# Patient Record
Sex: MALE | Race: WHITE | NOT HISPANIC OR LATINO | URBAN - METROPOLITAN AREA
[De-identification: names, ages, dates, MRNs, and addresses within clinical notes are randomized per-mention and may not be internally consistent; named-entity substitution may affect disease eponyms.]

---

## 2019-01-11 ENCOUNTER — OUTPATIENT (OUTPATIENT)
Dept: OUTPATIENT SERVICES | Facility: HOSPITAL | Age: 54
LOS: 1 days | Discharge: HOME | End: 2019-01-11

## 2019-01-11 VITALS
RESPIRATION RATE: 18 BRPM | SYSTOLIC BLOOD PRESSURE: 138 MMHG | TEMPERATURE: 98 F | HEIGHT: 69 IN | WEIGHT: 199.96 LBS | DIASTOLIC BLOOD PRESSURE: 80 MMHG | OXYGEN SATURATION: 100 % | HEART RATE: 68 BPM

## 2019-01-11 DIAGNOSIS — K42.9 UMBILICAL HERNIA WITHOUT OBSTRUCTION OR GANGRENE: ICD-10-CM

## 2019-01-11 DIAGNOSIS — Z01.818 ENCOUNTER FOR OTHER PREPROCEDURAL EXAMINATION: ICD-10-CM

## 2019-01-11 LAB
ALBUMIN SERPL ELPH-MCNC: 4.7 G/DL — SIGNIFICANT CHANGE UP (ref 3.5–5.2)
ALP SERPL-CCNC: 66 U/L — SIGNIFICANT CHANGE UP (ref 30–115)
ALT FLD-CCNC: 25 U/L — SIGNIFICANT CHANGE UP (ref 0–41)
ANION GAP SERPL CALC-SCNC: 16 MMOL/L — HIGH (ref 7–14)
APPEARANCE UR: CLEAR — SIGNIFICANT CHANGE UP
APTT BLD: 42.4 SEC — HIGH (ref 27–39.2)
AST SERPL-CCNC: 20 U/L — SIGNIFICANT CHANGE UP (ref 0–41)
BASOPHILS # BLD AUTO: 0.05 K/UL — SIGNIFICANT CHANGE UP (ref 0–0.2)
BASOPHILS NFR BLD AUTO: 0.6 % — SIGNIFICANT CHANGE UP (ref 0–1)
BILIRUB SERPL-MCNC: 0.6 MG/DL — SIGNIFICANT CHANGE UP (ref 0.2–1.2)
BILIRUB UR-MCNC: NEGATIVE — SIGNIFICANT CHANGE UP
BUN SERPL-MCNC: 16 MG/DL — SIGNIFICANT CHANGE UP (ref 10–20)
CALCIUM SERPL-MCNC: 9.8 MG/DL — SIGNIFICANT CHANGE UP (ref 8.5–10.1)
CHLORIDE SERPL-SCNC: 99 MMOL/L — SIGNIFICANT CHANGE UP (ref 98–110)
CO2 SERPL-SCNC: 26 MMOL/L — SIGNIFICANT CHANGE UP (ref 17–32)
COLOR SPEC: YELLOW — SIGNIFICANT CHANGE UP
CREAT SERPL-MCNC: 1 MG/DL — SIGNIFICANT CHANGE UP (ref 0.7–1.5)
DIFF PNL FLD: NEGATIVE — SIGNIFICANT CHANGE UP
EOSINOPHIL # BLD AUTO: 0.37 K/UL — SIGNIFICANT CHANGE UP (ref 0–0.7)
EOSINOPHIL NFR BLD AUTO: 4.5 % — SIGNIFICANT CHANGE UP (ref 0–8)
GLUCOSE SERPL-MCNC: 78 MG/DL — SIGNIFICANT CHANGE UP (ref 70–99)
GLUCOSE UR QL: NEGATIVE MG/DL — SIGNIFICANT CHANGE UP
HCT VFR BLD CALC: 45.7 % — SIGNIFICANT CHANGE UP (ref 42–52)
HGB BLD-MCNC: 15.6 G/DL — SIGNIFICANT CHANGE UP (ref 14–18)
IMM GRANULOCYTES NFR BLD AUTO: 0.4 % — HIGH (ref 0.1–0.3)
INR BLD: 1.04 RATIO — SIGNIFICANT CHANGE UP (ref 0.65–1.3)
KETONES UR-MCNC: NEGATIVE — SIGNIFICANT CHANGE UP
LEUKOCYTE ESTERASE UR-ACNC: NEGATIVE — SIGNIFICANT CHANGE UP
LYMPHOCYTES # BLD AUTO: 2.2 K/UL — SIGNIFICANT CHANGE UP (ref 1.2–3.4)
LYMPHOCYTES # BLD AUTO: 26.7 % — SIGNIFICANT CHANGE UP (ref 20.5–51.1)
MCHC RBC-ENTMCNC: 31.1 PG — HIGH (ref 27–31)
MCHC RBC-ENTMCNC: 34.1 G/DL — SIGNIFICANT CHANGE UP (ref 32–37)
MCV RBC AUTO: 91 FL — SIGNIFICANT CHANGE UP (ref 80–94)
MONOCYTES # BLD AUTO: 0.56 K/UL — SIGNIFICANT CHANGE UP (ref 0.1–0.6)
MONOCYTES NFR BLD AUTO: 6.8 % — SIGNIFICANT CHANGE UP (ref 1.7–9.3)
NEUTROPHILS # BLD AUTO: 5.02 K/UL — SIGNIFICANT CHANGE UP (ref 1.4–6.5)
NEUTROPHILS NFR BLD AUTO: 61 % — SIGNIFICANT CHANGE UP (ref 42.2–75.2)
NITRITE UR-MCNC: NEGATIVE — SIGNIFICANT CHANGE UP
NRBC # BLD: 0 /100 WBCS — SIGNIFICANT CHANGE UP (ref 0–0)
PH UR: 6.5 — SIGNIFICANT CHANGE UP (ref 5–8)
PLATELET # BLD AUTO: 285 K/UL — SIGNIFICANT CHANGE UP (ref 130–400)
POTASSIUM SERPL-MCNC: 4.1 MMOL/L — SIGNIFICANT CHANGE UP (ref 3.5–5)
POTASSIUM SERPL-SCNC: 4.1 MMOL/L — SIGNIFICANT CHANGE UP (ref 3.5–5)
PROT SERPL-MCNC: 7 G/DL — SIGNIFICANT CHANGE UP (ref 6–8)
PROT UR-MCNC: NEGATIVE MG/DL — SIGNIFICANT CHANGE UP
PROTHROM AB SERPL-ACNC: 11.9 SEC — SIGNIFICANT CHANGE UP (ref 9.95–12.87)
RBC # BLD: 5.02 M/UL — SIGNIFICANT CHANGE UP (ref 4.7–6.1)
RBC # FLD: 13.2 % — SIGNIFICANT CHANGE UP (ref 11.5–14.5)
SODIUM SERPL-SCNC: 141 MMOL/L — SIGNIFICANT CHANGE UP (ref 135–146)
SP GR SPEC: 1.01 — SIGNIFICANT CHANGE UP (ref 1.01–1.03)
UROBILINOGEN FLD QL: 0.2 MG/DL — SIGNIFICANT CHANGE UP (ref 0.2–0.2)
WBC # BLD: 8.23 K/UL — SIGNIFICANT CHANGE UP (ref 4.8–10.8)
WBC # FLD AUTO: 8.23 K/UL — SIGNIFICANT CHANGE UP (ref 4.8–10.8)

## 2019-01-11 NOTE — H&P PST ADULT - REASON FOR ADMISSION
52 yo m here for past, pt sched for umb hernia rep on 1/25/2019  denies any cp, palpitations,sob, fever, dysuria, uti.uri, no rec travels  exc tashia 2+ fos wo sob  no glasses  no hear def  no loose teeth/ no dent

## 2019-01-11 NOTE — H&P PST ADULT - NSANTHOSAYNRD_GEN_A_CORE
No. BERENICE screening performed.  STOP BANG Legend: 0-2 = LOW Risk; 3-4 = INTERMEDIATE Risk; 5-8 = HIGH Risk

## 2019-01-25 ENCOUNTER — OUTPATIENT (OUTPATIENT)
Dept: OUTPATIENT SERVICES | Facility: HOSPITAL | Age: 54
LOS: 1 days | Discharge: HOME | End: 2019-01-25

## 2019-01-25 VITALS
HEART RATE: 68 BPM | RESPIRATION RATE: 18 BRPM | TEMPERATURE: 98 F | HEIGHT: 69 IN | SYSTOLIC BLOOD PRESSURE: 116 MMHG | DIASTOLIC BLOOD PRESSURE: 78 MMHG | WEIGHT: 199.96 LBS | OXYGEN SATURATION: 98 %

## 2019-01-25 VITALS
OXYGEN SATURATION: 98 % | RESPIRATION RATE: 19 BRPM | HEART RATE: 66 BPM | SYSTOLIC BLOOD PRESSURE: 117 MMHG | DIASTOLIC BLOOD PRESSURE: 82 MMHG

## 2019-01-25 DIAGNOSIS — M89.9 DISORDER OF BONE, UNSPECIFIED: Chronic | ICD-10-CM

## 2019-01-25 RX ORDER — MORPHINE SULFATE 50 MG/1
2 CAPSULE, EXTENDED RELEASE ORAL
Qty: 0 | Refills: 0 | Status: DISCONTINUED | OUTPATIENT
Start: 2019-01-25 | End: 2019-01-25

## 2019-01-25 RX ORDER — SODIUM CHLORIDE 9 MG/ML
1000 INJECTION, SOLUTION INTRAVENOUS
Qty: 0 | Refills: 0 | Status: DISCONTINUED | OUTPATIENT
Start: 2019-01-25 | End: 2019-02-09

## 2019-01-25 RX ORDER — ONDANSETRON 8 MG/1
4 TABLET, FILM COATED ORAL ONCE
Qty: 0 | Refills: 0 | Status: DISCONTINUED | OUTPATIENT
Start: 2019-01-25 | End: 2019-02-09

## 2019-01-25 RX ORDER — ACETAMINOPHEN WITH CODEINE 300MG-30MG
1 TABLET ORAL
Qty: 20 | Refills: 0 | OUTPATIENT
Start: 2019-01-25

## 2019-01-25 RX ORDER — HYDROMORPHONE HYDROCHLORIDE 2 MG/ML
0.5 INJECTION INTRAMUSCULAR; INTRAVENOUS; SUBCUTANEOUS
Qty: 0 | Refills: 0 | Status: DISCONTINUED | OUTPATIENT
Start: 2019-01-25 | End: 2019-01-25

## 2019-01-25 RX ORDER — OXYCODONE AND ACETAMINOPHEN 5; 325 MG/1; MG/1
1 TABLET ORAL ONCE
Qty: 0 | Refills: 0 | Status: DISCONTINUED | OUTPATIENT
Start: 2019-01-25 | End: 2019-01-25

## 2019-01-25 RX ADMIN — SODIUM CHLORIDE 100 MILLILITER(S): 9 INJECTION, SOLUTION INTRAVENOUS at 08:12

## 2019-01-25 NOTE — CHART NOTE - NSCHARTNOTEFT_GEN_A_CORE
PACU ANESTHESIA ADMISSION NOTE      Procedure: Umbilical hernia repair    Post op diagnosis:  Umbilical hernia      ____  Intubated  TV:______       Rate: ______      FiO2: ______    __x__  Patent Airway    __x__  Full return of protective reflexes    __x__  Full recovery from anesthesia / back to baseline status      Vitals:   /84           HR  72         RR     12        O2 sat   98        Temp 97.6 F      Mental Status:  __x__ Awake   _____ Alert   _____ Drowsy   _____ Sedated    Nausea/Vomiting:  __x__ No    ____ Yes, See Post - Op Orders        Pain Scale (0-10):  __0___    Treatment: ____ None    ____ See Post - Op/PCA Orders    Post - Operative Fluids:   __x__ Oral   ____ See Post - Op Orders    Plan: Discharge:   _x___Home       _____Floor     _____Critical Care    _____  Other:_________________    Comments: No anesthesia complications noted.  Discharge once criteria met.

## 2019-01-30 DIAGNOSIS — K42.9 UMBILICAL HERNIA WITHOUT OBSTRUCTION OR GANGRENE: ICD-10-CM

## 2019-02-01 ENCOUNTER — TRANSCRIPTION ENCOUNTER (OUTPATIENT)
Age: 54
End: 2019-02-01

## 2020-01-17 ENCOUNTER — TRANSCRIPTION ENCOUNTER (OUTPATIENT)
Age: 55
End: 2020-01-17

## 2020-05-18 ENCOUNTER — EMERGENCY (EMERGENCY)
Facility: HOSPITAL | Age: 55
LOS: 0 days | Discharge: HOME | End: 2020-05-19
Attending: STUDENT IN AN ORGANIZED HEALTH CARE EDUCATION/TRAINING PROGRAM | Admitting: STUDENT IN AN ORGANIZED HEALTH CARE EDUCATION/TRAINING PROGRAM
Payer: COMMERCIAL

## 2020-05-18 VITALS
RESPIRATION RATE: 18 BRPM | SYSTOLIC BLOOD PRESSURE: 140 MMHG | HEART RATE: 92 BPM | TEMPERATURE: 98 F | OXYGEN SATURATION: 100 % | DIASTOLIC BLOOD PRESSURE: 68 MMHG

## 2020-05-18 DIAGNOSIS — R07.9 CHEST PAIN, UNSPECIFIED: ICD-10-CM

## 2020-05-18 DIAGNOSIS — M89.9 DISORDER OF BONE, UNSPECIFIED: Chronic | ICD-10-CM

## 2020-05-18 DIAGNOSIS — R10.13 EPIGASTRIC PAIN: ICD-10-CM

## 2020-05-18 DIAGNOSIS — R07.89 OTHER CHEST PAIN: ICD-10-CM

## 2020-05-18 LAB
ALBUMIN SERPL ELPH-MCNC: 4.5 G/DL — SIGNIFICANT CHANGE UP (ref 3.5–5.2)
ALP SERPL-CCNC: 61 U/L — SIGNIFICANT CHANGE UP (ref 30–115)
ALT FLD-CCNC: 29 U/L — SIGNIFICANT CHANGE UP (ref 0–41)
ANION GAP SERPL CALC-SCNC: 15 MMOL/L — HIGH (ref 7–14)
AST SERPL-CCNC: 25 U/L — SIGNIFICANT CHANGE UP (ref 0–41)
BASOPHILS # BLD AUTO: 0.05 K/UL — SIGNIFICANT CHANGE UP (ref 0–0.2)
BASOPHILS NFR BLD AUTO: 0.6 % — SIGNIFICANT CHANGE UP (ref 0–1)
BILIRUB SERPL-MCNC: 0.3 MG/DL — SIGNIFICANT CHANGE UP (ref 0.2–1.2)
BUN SERPL-MCNC: 17 MG/DL — SIGNIFICANT CHANGE UP (ref 10–20)
CALCIUM SERPL-MCNC: 9.2 MG/DL — SIGNIFICANT CHANGE UP (ref 8.5–10.1)
CHLORIDE SERPL-SCNC: 104 MMOL/L — SIGNIFICANT CHANGE UP (ref 98–110)
CO2 SERPL-SCNC: 24 MMOL/L — SIGNIFICANT CHANGE UP (ref 17–32)
CREAT SERPL-MCNC: 0.9 MG/DL — SIGNIFICANT CHANGE UP (ref 0.7–1.5)
EOSINOPHIL # BLD AUTO: 0.21 K/UL — SIGNIFICANT CHANGE UP (ref 0–0.7)
EOSINOPHIL NFR BLD AUTO: 2.5 % — SIGNIFICANT CHANGE UP (ref 0–8)
GLUCOSE SERPL-MCNC: 104 MG/DL — HIGH (ref 70–99)
HCT VFR BLD CALC: 45.4 % — SIGNIFICANT CHANGE UP (ref 42–52)
HGB BLD-MCNC: 15.6 G/DL — SIGNIFICANT CHANGE UP (ref 14–18)
IMM GRANULOCYTES NFR BLD AUTO: 0.2 % — SIGNIFICANT CHANGE UP (ref 0.1–0.3)
LIDOCAIN IGE QN: 45 U/L — SIGNIFICANT CHANGE UP (ref 7–60)
LYMPHOCYTES # BLD AUTO: 1.96 K/UL — SIGNIFICANT CHANGE UP (ref 1.2–3.4)
LYMPHOCYTES # BLD AUTO: 23.6 % — SIGNIFICANT CHANGE UP (ref 20.5–51.1)
MCHC RBC-ENTMCNC: 32 PG — HIGH (ref 27–31)
MCHC RBC-ENTMCNC: 34.4 G/DL — SIGNIFICANT CHANGE UP (ref 32–37)
MCV RBC AUTO: 93 FL — SIGNIFICANT CHANGE UP (ref 80–94)
MONOCYTES # BLD AUTO: 0.51 K/UL — SIGNIFICANT CHANGE UP (ref 0.1–0.6)
MONOCYTES NFR BLD AUTO: 6.1 % — SIGNIFICANT CHANGE UP (ref 1.7–9.3)
NEUTROPHILS # BLD AUTO: 5.55 K/UL — SIGNIFICANT CHANGE UP (ref 1.4–6.5)
NEUTROPHILS NFR BLD AUTO: 67 % — SIGNIFICANT CHANGE UP (ref 42.2–75.2)
NRBC # BLD: 0 /100 WBCS — SIGNIFICANT CHANGE UP (ref 0–0)
PLATELET # BLD AUTO: 274 K/UL — SIGNIFICANT CHANGE UP (ref 130–400)
POTASSIUM SERPL-MCNC: 4.3 MMOL/L — SIGNIFICANT CHANGE UP (ref 3.5–5)
POTASSIUM SERPL-SCNC: 4.3 MMOL/L — SIGNIFICANT CHANGE UP (ref 3.5–5)
PROT SERPL-MCNC: 6.6 G/DL — SIGNIFICANT CHANGE UP (ref 6–8)
RBC # BLD: 4.88 M/UL — SIGNIFICANT CHANGE UP (ref 4.7–6.1)
RBC # FLD: 13.2 % — SIGNIFICANT CHANGE UP (ref 11.5–14.5)
SODIUM SERPL-SCNC: 143 MMOL/L — SIGNIFICANT CHANGE UP (ref 135–146)
TROPONIN T SERPL-MCNC: <0.01 NG/ML — SIGNIFICANT CHANGE UP
WBC # BLD: 8.3 K/UL — SIGNIFICANT CHANGE UP (ref 4.8–10.8)
WBC # FLD AUTO: 8.3 K/UL — SIGNIFICANT CHANGE UP (ref 4.8–10.8)

## 2020-05-18 PROCEDURE — 93010 ELECTROCARDIOGRAM REPORT: CPT

## 2020-05-18 PROCEDURE — 99220: CPT

## 2020-05-18 PROCEDURE — 71046 X-RAY EXAM CHEST 2 VIEWS: CPT | Mod: 26

## 2020-05-18 RX ORDER — FAMOTIDINE 10 MG/ML
20 INJECTION INTRAVENOUS DAILY
Refills: 0 | Status: DISCONTINUED | OUTPATIENT
Start: 2020-05-18 | End: 2020-05-19

## 2020-05-18 RX ORDER — FAMOTIDINE 10 MG/ML
20 INJECTION INTRAVENOUS ONCE
Refills: 0 | Status: COMPLETED | OUTPATIENT
Start: 2020-05-18 | End: 2020-05-18

## 2020-05-18 RX ORDER — ACETAMINOPHEN 500 MG
650 TABLET ORAL ONCE
Refills: 0 | Status: COMPLETED | OUTPATIENT
Start: 2020-05-18 | End: 2020-05-18

## 2020-05-18 RX ADMIN — Medication 650 MILLIGRAM(S): at 21:47

## 2020-05-18 RX ADMIN — FAMOTIDINE 104 MILLIGRAM(S): 10 INJECTION INTRAVENOUS at 16:05

## 2020-05-18 RX ADMIN — FAMOTIDINE 20 MILLIGRAM(S): 10 INJECTION INTRAVENOUS at 21:47

## 2020-05-18 NOTE — ED CDU PROVIDER INITIAL DAY NOTE - ATTENDING CONTRIBUTION TO CARE
55 yo m no pmh, former smoker here for cp. pt states CP began as epigastric pain radiating up. pain had improved a bit over the day but he is still experiencing midsternal chest tightness.  no sob, lightheadedness, n,v,d,fever, chills, cough.    vss  gen- NAD, aaox3  card-rrr  lungs-ctab, no wheezing or rhonchi  abd-sntnd, no guarding or rebound  neuro- full str/sensation, cn ii-xii grossly intact, normal coordination and gait    in ed- ekg w/o e/o acute ischaemia, trop neg.  pt placed in obs for acs r/o

## 2020-05-18 NOTE — ED PROVIDER NOTE - NS ED ROS FT
Review of Systems:  	•	CONSTITUTIONAL: no fever, no diaphoresis, no chills  	•	SKIN: no rash  	•	HEMATOLOGIC: no bleeding, no bruising  	•	ENT: no sore throat, no difficulty swallowing   	•	RESPIRATORY: no shortness of breath, no cough  	•	CARDIAC: +chest pain, no palpitations  	•	GI: +epigastric discomfort, no nausea, no vomiting, no diarrhea   	•	MUSCULOSKELETAL: no joint paint, no swelling, no redness  	•	NEUROLOGIC: no headache, no UE weakness, no numbness, no paresthesias   	•	PSYCH: no anxiety, no depression

## 2020-05-18 NOTE — ED CDU PROVIDER INITIAL DAY NOTE - PROGRESS NOTE DETAILS
pt seen bedside, nad, still having some chest discomfort. will repeat ekg. pt scheduled for CCTA in am. will continue to monitor.

## 2020-05-18 NOTE — ED PROVIDER NOTE - PROGRESS NOTE DETAILS
Bedside aorta/cardiac ultrasound performed. no evidence of acute pathology. pending labs. Patient reports moderate improvement of epigastric discomfort after pepcid. patient agrees to obs.

## 2020-05-18 NOTE — ED PROVIDER NOTE - OBJECTIVE STATEMENT
54 year old male, past medical history GERD, who presents with gradual onset of moderate, central, non-radiating chest tightness. Patient reports two episodes of pain, self resolved PTA. Patient also reports mild epigastric discomfort x3 days. No hx similar, no hx appendectomy. No fever, chills, cough, UE weakness/numbness/paresthesias, diaphoresis, nausea, vomiting, diarrhea, urinary symptoms. Last colonoscopy x1 year ago, unremarkable.

## 2020-05-18 NOTE — ED PROVIDER NOTE - ATTENDING CONTRIBUTION TO CARE
I personally evaluated the patient. I reviewed the Resident’s or Physician Assistant’s note (as assigned above), and agree with the findings and plan except as documented in my note.   55 Y/O M GERD, FORMER SMOKER C/O 2-3 DAYS FO LUQ PAIN AND TODAY CP. CP IS SUBSTERNAL AND RADIATES TO THE BACK. PAIN IS INTERMITTENT AND NOT EXERTIONAL OR PLEURITIC. NO ASSOCIATED SOB, NAUSEA, DIAPHORESIS, DIZZINESS, PALPITATIONS. NO LEG EDEMA OR PAIN. NO N/V/D. NO FEVER, COUGH. NO PRIOR CARDIAC WORKUP. VITALS NOTED. ALERT OX3 NAD WELL APPEARING. NCAT PERRL. EOMI. OP NORMAL. MMM. NECK SUPPLE. LUNGS CLEAR B/L. RRR. S1S2. ABD- SOFT NONTENDER. NO LEG EDEMA. CN 2-12 INTACT. NEURO EXAM NONFOCAL.

## 2020-05-18 NOTE — ED PROVIDER NOTE - CLINICAL SUMMARY MEDICAL DECISION MAKING FREE TEXT BOX
53 Y/O M + SMOKER WITH CP. EKG WITH NO ISCHEMIC CHANGES. TROPONIN NORMAL. PT TO [facility] FOR SERIAL EKGS/MONIQUE AND PROVOCATIVE TESTING.

## 2020-05-18 NOTE — ED PROVIDER NOTE - PHYSICAL EXAMINATION
CONST: Well appearing in NAD  EYES: Sclera and conjunctiva clear.   ENT: Oropharynx normal appearing, no erythema or exudates. No abscess or swelling. Uvula midline.   NECK: Non-tender, normal ROM   CARD: Normal S1 S2; Normal rate and rhythm  RESP: Equal BS B/L, No wheezes, rhonchi or rales. No distress  GI: Soft, non-tender, non-distended. no rebound or guarding  MS: No chest wall TTP, no overlying skin changes. Normal ROM in all extremities. No midline spinal tenderness. no calf tenderness or swelling   SKIN: Warm, dry, no acute rashes.   NEURO: A&Ox3, No focal deficits. Strength 5/5 UE/LE bilaterally with no sensory deficits.

## 2020-05-18 NOTE — ED CDU PROVIDER INITIAL DAY NOTE - OBJECTIVE STATEMENT
55 y/o male with no significant PMHx, Former Smoker presenting for chest pain. As per pt, chest pain began this morning and gradually worsened throughout the day. Pain is midsternal, constant, tightness sensation. Pt does admit to pain in the back in between his scapulas. Denies any other associated symptoms SOB/, syncope, dizziness, N/V/D, fever/chills, diaphoresis, numbness/tingling

## 2020-05-19 VITALS
RESPIRATION RATE: 18 BRPM | HEART RATE: 56 BPM | DIASTOLIC BLOOD PRESSURE: 86 MMHG | SYSTOLIC BLOOD PRESSURE: 131 MMHG | OXYGEN SATURATION: 99 % | TEMPERATURE: 97 F

## 2020-05-19 LAB — SARS-COV-2 RNA SPEC QL NAA+PROBE: SIGNIFICANT CHANGE UP

## 2020-05-19 PROCEDURE — 99217: CPT

## 2020-05-19 PROCEDURE — 75574 CT ANGIO HRT W/3D IMAGE: CPT | Mod: 26

## 2020-05-19 RX ORDER — FAMOTIDINE 10 MG/ML
1 INJECTION INTRAVENOUS
Qty: 14 | Refills: 0
Start: 2020-05-19 | End: 2020-06-01

## 2020-05-19 RX ORDER — METOPROLOL TARTRATE 50 MG
50 TABLET ORAL ONCE
Refills: 0 | Status: COMPLETED | OUTPATIENT
Start: 2020-05-19 | End: 2020-05-19

## 2020-05-19 RX ADMIN — Medication 50 MILLIGRAM(S): at 08:11

## 2020-05-19 NOTE — ED CDU PROVIDER SUBSEQUENT DAY NOTE - PROGRESS NOTE DETAILS
pt NAD, no complaints, had relief from famotidine. pt negative trops and ekg. Scheduled for ccta in am. will continue to monitor pt. Received pt from JAIRO Encarnacion, pt awaiting CCTA. HR 68-70, will provide lopressor 50mg prior. Pt resting comfortably. No current CP

## 2020-05-19 NOTE — ED CDU PROVIDER DISPOSITION NOTE - CARE PROVIDER_API CALL
Farnaz Trejo  Gastroenterology  [address] [place] [address]  Phone: [phone]  Fax: [phone]  Follow Up Time:

## 2020-05-19 NOTE — ED ADULT NURSE REASSESSMENT NOTE - NS ED NURSE REASSESS COMMENT FT1
patient sleeping with no difficulty breathing noted. Patient remains on cardiac monitor. Patient IV access flushes with no difficulty.  Patient in stable condition and nad.  Will continue to monitor.
Pt resting quietly. VSS. Denies cp. Awaiting ccta.

## 2020-05-19 NOTE — ED CDU PROVIDER DISPOSITION NOTE - CLINICAL COURSE
pt placed in obs for ACS r/o. ekg w/o e/o acute ischaemia, trop neg. CCTA CADRAD 0. COVID neg. Pt remained stable on serial evaluation. will treat as possible gastritis/esophagitis w/ trial pepcid, pcp f/u, gi ref,

## 2020-05-19 NOTE — ED CDU PROVIDER DISPOSITION NOTE - NSFOLLOWUPINSTRUCTIONS_ED_ALL_ED_FT
Follow up with your primary care doctor in 2-3 days for reassessment. Follow up with gastroenterology for reassessment within the next week. Return for worsening symptoms, passing out or other concerning symptoms.    Chest Pain    Chest pain can be caused by many different conditions which may or may not be dangerous. Causes include heartburn, lung infections, heart attack, blood clot in lungs, skin infections, strain or damage to muscle, cartilage, or bones, etc. In addition to a history and physical examination, an electrocardiogram (ECG) or other lab tests may have been performed to determine the cause of your chest pain. Follow up with your primary care provider or with a cardiologist as instructed.     SEEK IMMEDIATE MEDICAL CARE IF YOU HAVE ANY OF THE FOLLOWING SYMPTOMS: worsening chest pain, coughing up blood, unexplained back/neck/jaw pain, severe abdominal pain, dizziness or lightheadedness, fainting, shortness of breath, sweaty or clammy skin, vomiting, or racing heart beat. These symptoms may represent a serious problem that is an emergency. Do not wait to see if the symptoms will go away. Get medical help right away. Call 911 and do not drive yourself to the hospital.

## 2020-05-19 NOTE — ED CDU PROVIDER DISPOSITION NOTE - PATIENT PORTAL LINK FT
You can access the FollowMyHealth Patient Portal offered by [facility] by registering at the following website: [email or web address]. By joining [employer]’s FollowMyHealth portal, you will also be able to view your health information using other applications (apps) compatible with our system.

## 2020-05-19 NOTE — ED ADULT NURSE REASSESSMENT NOTE - ANCILLARY STATUS
Problem: Falls - Risk of:  Goal: Will remain free from falls  Description  Will remain free from falls  Outcome: Met This Shift  Goal: Absence of physical injury  Description  Absence of physical injury  Outcome: Met This Shift     Problem: Risk for Impaired Skin Integrity  Goal: Tissue integrity - skin and mucous membranes  Description  Structural intactness and normal physiological function of skin and  mucous membranes.   Outcome: Met This Shift cardiovascular tests pending

## 2023-05-17 ENCOUNTER — EMERGENCY (EMERGENCY)
Facility: HOSPITAL | Age: 58
LOS: 0 days | Discharge: HOME | End: 2023-05-17
Attending: EMERGENCY MEDICINE
Payer: COMMERCIAL

## 2023-05-17 VITALS
HEART RATE: 67 BPM | TEMPERATURE: 98 F | OXYGEN SATURATION: 97 % | RESPIRATION RATE: 18 BRPM | DIASTOLIC BLOOD PRESSURE: 92 MMHG | HEIGHT: 69 IN | SYSTOLIC BLOOD PRESSURE: 154 MMHG | WEIGHT: 214.95 LBS

## 2023-05-17 DIAGNOSIS — R06.7 SNEEZING: ICD-10-CM

## 2023-05-17 DIAGNOSIS — H57.89 OTHER SPECIFIED DISORDERS OF EYE AND ADNEXA: ICD-10-CM

## 2023-05-17 DIAGNOSIS — I10 ESSENTIAL (PRIMARY) HYPERTENSION: ICD-10-CM

## 2023-05-17 DIAGNOSIS — K21.9 GASTRO-ESOPHAGEAL REFLUX DISEASE WITHOUT ESOPHAGITIS: ICD-10-CM

## 2023-05-17 DIAGNOSIS — R09.81 NASAL CONGESTION: ICD-10-CM

## 2023-05-17 DIAGNOSIS — K08.89 OTHER SPECIFIED DISORDERS OF TEETH AND SUPPORTING STRUCTURES: ICD-10-CM

## 2023-05-17 DIAGNOSIS — J34.89 OTHER SPECIFIED DISORDERS OF NOSE AND NASAL SINUSES: ICD-10-CM

## 2023-05-17 DIAGNOSIS — M89.9 DISORDER OF BONE, UNSPECIFIED: Chronic | ICD-10-CM

## 2023-05-17 PROCEDURE — 99283 EMERGENCY DEPT VISIT LOW MDM: CPT

## 2023-05-17 RX ORDER — AMOXICILLIN 250 MG/5ML
1 SUSPENSION, RECONSTITUTED, ORAL (ML) ORAL
Qty: 21 | Refills: 0
Start: 2023-05-17 | End: 2023-05-23

## 2023-05-17 RX ORDER — IBUPROFEN 200 MG
600 TABLET ORAL ONCE
Refills: 0 | Status: COMPLETED | OUTPATIENT
Start: 2023-05-17 | End: 2023-05-17

## 2023-05-17 RX ORDER — IBUPROFEN 200 MG
1 TABLET ORAL
Qty: 21 | Refills: 0
Start: 2023-05-17 | End: 2023-05-23

## 2023-05-17 RX ADMIN — Medication 600 MILLIGRAM(S): at 08:59

## 2023-05-17 NOTE — ED PROVIDER NOTE - NS ED ATTENDING STATEMENT MOD
This was a shared visit with the ISA. I reviewed and verified the documentation and independently performed the documented:

## 2023-05-17 NOTE — ED PROVIDER NOTE - OBJECTIVE STATEMENT
57-year-old male with history of hypertension presents to the ED complaining of continued sneezing and right eye tearing status post root canal procedure last night on right side.  No fever, chills, headache, dizziness, change in vision or difficulty swallowing.

## 2023-05-17 NOTE — ED PROVIDER NOTE - ATTENDING APP SHARED VISIT CONTRIBUTION OF CARE
57-year-old male past medical history hypertension presents with right eye tearing and sneezing since root canal last night.  No fever or chills.  No numbness weakness blurry vision slurred speech.  No chest pain shortness of breath.    On exam, AFVSS, Well appearing, No acute distress, NCAT, EOMI, PERRLA, MMM, Neck supple, right eye minor tearing, no rhinorrhea no active sneezing, right upper tooth tenderness to palpation, no gum swelling no facial swelling,, AAOx3, cranial nerves intact, 5 out of 5 motor x4 extremities, sensation intact lower extremity, normal gait no Focal Deficits, No LE edema or calf TTP,    A/P; sneezing/ tearing status post root canal, will start p.o. antibiotics, patient advised to see dentist as soon as possible as outpatient today if possible, strict return precautions

## 2023-05-17 NOTE — ED ADULT NURSE NOTE - NSFALLUNIVINTERV_ED_ALL_ED
Bed/Stretcher in lowest position, wheels locked, appropriate side rails in place/Call bell, personal items and telephone in reach/Instruct patient to call for assistance before getting out of bed/chair/stretcher/Non-slip footwear applied when patient is off stretcher/[place] to call system/Physically safe environment - no spills, clutter or unnecessary equipment/Purposeful proactive rounding/Room/bathroom lighting operational, light cord in reach

## 2023-05-17 NOTE — ED ADULT TRIAGE NOTE - CHIEF COMPLAINT QUOTE
Return for increasing shortness of air or other concerns.  
Patient had a root canal last night and ever since has been having increased congestion on the right side of his face associated with uncontrollably sneezing.

## 2023-05-17 NOTE — ED PROVIDER NOTE - NSICDXFAMILYHX_GEN_ALL_CORE_FT
FAMILY HISTORY:  Father  Still living? Unknown  Family history of early CAD, Age at diagnosis: 71-80

## 2023-05-17 NOTE — ED ADULT NURSE NOTE - CHIEF COMPLAINT QUOTE
Patient had a root canal last night and ever since has been having increased congestion on the right side of his face associated with uncontrollably sneezing.

## 2023-05-17 NOTE — ED PROVIDER NOTE - PATIENT PORTAL LINK FT
You can access the FollowMyHealth Patient Portal offered by [facility] by registering at the following website: [email or web address]. By joining [employer]’s FollowMyHealth portal, you will also be able to view your health information using other applications (apps) compatible with our system.

## 2024-02-05 ENCOUNTER — APPOINTMENT (OUTPATIENT)
Dept: ORTHOPEDIC SURGERY | Facility: CLINIC | Age: 59
End: 2024-02-05
Payer: OTHER MISCELLANEOUS

## 2024-02-05 VITALS — BODY MASS INDEX: 32.58 KG/M2 | WEIGHT: 220 LBS | HEIGHT: 69 IN

## 2024-02-05 DIAGNOSIS — Z78.9 OTHER SPECIFIED HEALTH STATUS: ICD-10-CM

## 2024-02-05 DIAGNOSIS — S89.91XA UNSPECIFIED INJURY OF RIGHT LOWER LEG, INITIAL ENCOUNTER: ICD-10-CM

## 2024-02-05 PROBLEM — Z00.00 ENCOUNTER FOR PREVENTIVE HEALTH EXAMINATION: Status: ACTIVE | Noted: 2024-02-05

## 2024-02-05 PROCEDURE — 73562 X-RAY EXAM OF KNEE 3: CPT | Mod: RT

## 2024-02-05 PROCEDURE — 99203 OFFICE O/P NEW LOW 30 MIN: CPT | Mod: ACP

## 2024-02-05 NOTE — DISCUSSION/SUMMARY
[de-identified] : Patient has right knee injury.  MRI ordered for further evaluation including possible internal derangement/meniscal tear.  Advised rest, ice that she can range of motion excess, physical therapy.  Physical therapy prescription given.  Advised patient use knee sleeve for support.  Patient would like to continue working, states that his knee is overall stable and he is comfortable at work.  Patient is minimally partially disabled, is able to work.  Patient will follow-up in 6 weeks with sports for reevaluation after MRI results.  Naproxen sent to patient's pharmacy, discuss side effects in detail.

## 2024-02-05 NOTE — HISTORY OF PRESENT ILLNESS
[de-identified] : Patient is a 50-year-old male here for evaluation of right knee injury.  Patient is an .  Patient states on 1/24/2024 while at work he was carrying about 30 pounds of equipment when his right knee buckled and gave out.  Patient was immediate pain.  Patient states that he has since been working through the pain, pain has slightly improved but has decreased range of motion and pain with certain movements.  Denies numbness/tingling

## 2024-02-22 ENCOUNTER — APPOINTMENT (OUTPATIENT)
Dept: MRI IMAGING | Facility: CLINIC | Age: 59
End: 2024-02-22
Payer: OTHER MISCELLANEOUS

## 2024-02-22 PROCEDURE — 73721 MRI JNT OF LWR EXTRE W/O DYE: CPT | Mod: RT

## 2024-03-18 ENCOUNTER — APPOINTMENT (OUTPATIENT)
Dept: ORTHOPEDIC SURGERY | Facility: CLINIC | Age: 59
End: 2024-03-18
Payer: OTHER MISCELLANEOUS

## 2024-03-18 PROCEDURE — 99213 OFFICE O/P EST LOW 20 MIN: CPT

## 2024-03-18 NOTE — HISTORY OF PRESENT ILLNESS
[de-identified] : cc rt knee.   58-year-old male presents rt knee pain. He works for the [employer]. He states on 1/24/2024 while at work, he was walking downstairs, he was carrying 30 pounds of equipment when his right knee buckled and gave out. He states kneeling gives him pain and discomfort as well as driving. Denies attending formal physical therapy. He states he is taking anti-inflammatory and states that is does not give him relief.   MRI of the right knee shows PCL degeneration with full thickness mid-segment PCL tear and slight ligament laxity. Trace joint effusion.  Nondisplaced degenerative tear in the central root of the posterior horn of the medial meniscus.   on exam right knee good range of motion +3 Lachman equal external rotation at 30 degrees flexion and at 90 degrees flexion.  Left and right knees  Diagnosis right knee PCL tear  recommending formal physical therapy. He will follow up in 6 weeks, will continue to work full duty mildly disabled, no ladders.  Will also use double upright hinged brace

## 2024-04-12 RX ORDER — NAPROXEN 500 MG/1
500 TABLET ORAL
Qty: 60 | Refills: 0 | Status: ACTIVE | COMMUNITY
Start: 2024-02-05 | End: 1900-01-01

## 2024-04-29 ENCOUNTER — APPOINTMENT (OUTPATIENT)
Dept: ORTHOPEDIC SURGERY | Facility: CLINIC | Age: 59
End: 2024-04-29
Payer: OTHER MISCELLANEOUS

## 2024-04-29 PROCEDURE — 99213 OFFICE O/P EST LOW 20 MIN: CPT

## 2024-04-29 NOTE — HISTORY OF PRESENT ILLNESS
[de-identified] : Patient is here for follow-up on the right knee PCL tear and put her to the posterior lateral aspect send 4 weeks of therapy is currently working full duty  Physical exam right knee has a negative dial test at 30 degrees flexion and 90 degrees flexion compared to the opposite knee Positive posterior drawer +3, negative anterior drawer, negative Homans' sign  MRI reviewed showing PCL tear  Diagnosis right knee PCL tear  Did discuss surgery with him of a PCL reconstruction with allograft, continue with therapy, sooner full duty mildly disabled, see him back in 4 to 6 weeks and if he still symptomatic we will discuss options and probably send out for the rotation for the PCL reconstruction

## 2024-05-03 NOTE — IMAGING
[de-identified] : Exam no effusion, ecchymosis, no erythema, tenderness palpation to medial lateral joint line, range of motion from 0 degrees to 120 degrees with mild discomfort to the lateral knee, good strength, positive Carlo's, otherwise ligamentously intact, neurovascular intact
no

## 2024-06-05 ENCOUNTER — APPOINTMENT (OUTPATIENT)
Dept: ORTHOPEDIC SURGERY | Facility: CLINIC | Age: 59
End: 2024-06-05
Payer: OTHER MISCELLANEOUS

## 2024-06-05 DIAGNOSIS — S89.91XD UNSPECIFIED INJURY OF RIGHT LOWER LEG, SUBSEQUENT ENCOUNTER: ICD-10-CM

## 2024-06-05 PROCEDURE — 99213 OFFICE O/P EST LOW 20 MIN: CPT

## 2024-06-05 NOTE — HISTORY OF PRESENT ILLNESS
[de-identified] : Patient is here for evaluation of the right knee he is improving with therapy is working full duty MRI with PCL tear  On exam is good no effusion good range of motion , positive posterior draw time +3  Diagnosis is right knee PCL tear  He is interested in new prescription for therapy and I agree this was generated continue working full duty I will see him back in another 2 months

## 2024-06-24 ENCOUNTER — RX RENEWAL (OUTPATIENT)
Age: 59
End: 2024-06-24

## 2024-06-24 RX ORDER — MELOXICAM 15 MG/1
15 TABLET ORAL DAILY
Qty: 30 | Refills: 0 | Status: ACTIVE | COMMUNITY
Start: 2024-04-29 | End: 1900-01-01

## 2024-08-07 ENCOUNTER — APPOINTMENT (OUTPATIENT)
Dept: ORTHOPEDIC SURGERY | Facility: CLINIC | Age: 59
End: 2024-08-07